# Patient Record
Sex: FEMALE | URBAN - METROPOLITAN AREA
[De-identification: names, ages, dates, MRNs, and addresses within clinical notes are randomized per-mention and may not be internally consistent; named-entity substitution may affect disease eponyms.]

---

## 2019-05-15 ENCOUNTER — OFFICE VISIT (OUTPATIENT)
Dept: URBAN - METROPOLITAN AREA CLINIC 23 | Facility: CLINIC | Age: 79
End: 2019-05-15
Payer: MEDICARE

## 2019-05-15 DIAGNOSIS — H25.813 COMBINED FORMS OF AGE-RELATED CATARACT, BILATERAL: Primary | ICD-10-CM

## 2019-05-15 PROCEDURE — 92004 COMPRE OPH EXAM NEW PT 1/>: CPT | Performed by: OPHTHALMOLOGY

## 2019-05-15 RX ORDER — PREDNISOLONE ACETATE 10 MG/ML
1 % SUSPENSION/ DROPS OPHTHALMIC
Qty: 1 | Refills: 1 | Status: INACTIVE
Start: 2019-05-15 | End: 2019-07-17

## 2019-05-15 RX ORDER — OFLOXACIN 3 MG/ML
0.3 % SOLUTION/ DROPS OPHTHALMIC
Qty: 1 | Refills: 1 | Status: INACTIVE
Start: 2019-05-15 | End: 2019-07-17

## 2019-05-15 ASSESSMENT — VISUAL ACUITY: OS: 20/50

## 2019-05-15 ASSESSMENT — INTRAOCULAR PRESSURE
OD: 18
OS: 18

## 2019-05-15 ASSESSMENT — KERATOMETRY
OS: 40.88
OD: 40.50

## 2019-05-15 NOTE — IMPRESSION/PLAN
Impression: Combined forms of age-related cataract, bilateral: H25.813. Condition: established, worsening. Symptoms: could improve with surgery. Plan: Cataract accounts for patient's complaints. Reviewed risks, benefits, and procedure. Patient desires surgery, schedule ce/iol OS then OD, RL2, discussed lens options, distance refractive target, patient is clear for surgery in [facility].

## 2019-06-05 ENCOUNTER — PRE-OPERATIVE VISIT (OUTPATIENT)
Dept: URBAN - METROPOLITAN AREA CLINIC 23 | Facility: CLINIC | Age: 79
End: 2019-06-05
Payer: MEDICARE

## 2019-06-05 PROCEDURE — 92136 OPHTHALMIC BIOMETRY: CPT | Performed by: OPHTHALMOLOGY

## 2019-06-05 ASSESSMENT — PACHYMETRY
OS: 24.04
OD: 24.17
OD: 2.80
OS: 2.84

## 2019-06-19 ENCOUNTER — SURGERY (OUTPATIENT)
Dept: URBAN - METROPOLITAN AREA SURGERY 11 | Facility: SURGERY | Age: 79
End: 2019-06-19
Payer: MEDICARE

## 2019-06-19 PROCEDURE — 66984 XCAPSL CTRC RMVL W/O ECP: CPT | Performed by: OPHTHALMOLOGY

## 2019-06-20 ENCOUNTER — POST-OPERATIVE VISIT (OUTPATIENT)
Dept: URBAN - METROPOLITAN AREA CLINIC 23 | Facility: CLINIC | Age: 79
End: 2019-06-20

## 2019-06-20 DIAGNOSIS — Z09 ENCNTR FOR F/U EXAM AFT TRTMT FOR COND OTH THAN MALIG NEOPLM: Primary | ICD-10-CM

## 2019-06-20 PROCEDURE — 99024 POSTOP FOLLOW-UP VISIT: CPT | Performed by: OPTOMETRIST

## 2019-06-20 ASSESSMENT — INTRAOCULAR PRESSURE
OD: 18
OS: 15

## 2019-06-26 ENCOUNTER — POST-OPERATIVE VISIT (OUTPATIENT)
Dept: URBAN - METROPOLITAN AREA CLINIC 23 | Facility: CLINIC | Age: 79
End: 2019-06-26

## 2019-06-26 PROCEDURE — 99024 POSTOP FOLLOW-UP VISIT: CPT | Performed by: OPTOMETRIST

## 2019-06-26 ASSESSMENT — INTRAOCULAR PRESSURE
OS: 15
OD: 18

## 2019-06-26 ASSESSMENT — VISUAL ACUITY
OD: 20/40-
OS: 20/20

## 2019-07-10 ENCOUNTER — SURGERY (OUTPATIENT)
Dept: URBAN - METROPOLITAN AREA SURGERY 11 | Facility: SURGERY | Age: 79
End: 2019-07-10
Payer: MEDICARE

## 2019-07-10 PROCEDURE — 66984 XCAPSL CTRC RMVL W/O ECP: CPT | Performed by: OPHTHALMOLOGY

## 2019-07-11 ENCOUNTER — POST-OPERATIVE VISIT (OUTPATIENT)
Dept: URBAN - METROPOLITAN AREA CLINIC 23 | Facility: CLINIC | Age: 79
End: 2019-07-11

## 2019-07-11 PROCEDURE — 99024 POSTOP FOLLOW-UP VISIT: CPT | Performed by: OPTOMETRIST

## 2019-07-11 ASSESSMENT — INTRAOCULAR PRESSURE
OS: 15
OD: 16

## 2019-07-17 ENCOUNTER — POST-OPERATIVE VISIT (OUTPATIENT)
Dept: URBAN - METROPOLITAN AREA CLINIC 23 | Facility: CLINIC | Age: 79
End: 2019-07-17

## 2019-07-17 PROCEDURE — 99024 POSTOP FOLLOW-UP VISIT: CPT | Performed by: OPTOMETRIST

## 2019-07-17 RX ORDER — PREDNISOLONE ACETATE 10 MG/ML
1 % SUSPENSION/ DROPS OPHTHALMIC
Qty: 1 | Refills: 1 | Status: ACTIVE
Start: 2019-07-17

## 2019-07-17 RX ORDER — OFLOXACIN 3 MG/ML
0.3 % SOLUTION/ DROPS OPHTHALMIC
Qty: 1 | Refills: 1 | Status: INACTIVE
Start: 2019-07-17 | End: 2019-08-08

## 2019-07-17 ASSESSMENT — VISUAL ACUITY
OD: 20/20
OS: 20/20

## 2019-07-17 ASSESSMENT — INTRAOCULAR PRESSURE
OD: 10
OS: 14

## 2019-08-08 ENCOUNTER — POST-OPERATIVE VISIT (OUTPATIENT)
Dept: URBAN - METROPOLITAN AREA CLINIC 23 | Facility: CLINIC | Age: 79
End: 2019-08-08

## 2019-08-08 PROCEDURE — 99024 POSTOP FOLLOW-UP VISIT: CPT | Performed by: OPTOMETRIST

## 2019-08-08 ASSESSMENT — INTRAOCULAR PRESSURE
OS: 12
OD: 12

## 2019-08-08 ASSESSMENT — VISUAL ACUITY
OD: 20/30
OS: 20/30